# Patient Record
Sex: MALE | URBAN - NONMETROPOLITAN AREA
[De-identification: names, ages, dates, MRNs, and addresses within clinical notes are randomized per-mention and may not be internally consistent; named-entity substitution may affect disease eponyms.]

---

## 2017-07-19 ENCOUNTER — OPTICAL OFFICE (OUTPATIENT)
Dept: URBAN - NONMETROPOLITAN AREA CLINIC 4 | Facility: CLINIC | Age: 42
Setting detail: OPHTHALMOLOGY
End: 2017-07-19
Payer: COMMERCIAL

## 2017-07-19 DIAGNOSIS — H52.223: ICD-10-CM

## 2017-07-19 PROCEDURE — V2020 VISION SVCS FRAMES PURCHASES: HCPCS | Performed by: OPHTHALMOLOGY

## 2017-07-19 PROCEDURE — V2750 ANTI-REFLECTIVE COATING: HCPCS | Performed by: OPHTHALMOLOGY

## 2017-07-19 PROCEDURE — V2025 EYEGLASSES DELUX FRAMES: HCPCS | Performed by: OPHTHALMOLOGY

## 2017-07-19 PROCEDURE — V2781 PROGRESSIVE LENS PER LENS: HCPCS | Performed by: OPHTHALMOLOGY

## 2017-07-19 PROCEDURE — V2203 LENS SPHCYL BIFOCAL 4.00D/.1: HCPCS | Performed by: OPHTHALMOLOGY

## 2021-02-04 ENCOUNTER — DOCTOR'S OFFICE (OUTPATIENT)
Dept: URBAN - NONMETROPOLITAN AREA CLINIC 2 | Facility: CLINIC | Age: 46
Setting detail: OPHTHALMOLOGY
End: 2021-02-04
Payer: COMMERCIAL

## 2021-02-04 ENCOUNTER — OPTICAL OFFICE (OUTPATIENT)
Dept: URBAN - NONMETROPOLITAN AREA CLINIC 5 | Facility: CLINIC | Age: 46
Setting detail: OPHTHALMOLOGY
End: 2021-02-04
Payer: COMMERCIAL

## 2021-02-04 ENCOUNTER — RX ONLY (RX ONLY)
Age: 46
End: 2021-02-04

## 2021-02-04 DIAGNOSIS — H52.222: ICD-10-CM

## 2021-02-04 DIAGNOSIS — H52.4: ICD-10-CM

## 2021-02-04 PROBLEM — D23.121 OTHER BENIGN NEOPLASM OF SKIN OF LEFT UPPER EYELID, INCLUDING CANTHUS: Status: ACTIVE | Noted: 2021-02-04

## 2021-02-04 PROCEDURE — V2200 LENS SPHER BIFOC PLANO 4.00D: HCPCS | Performed by: OPTOMETRIST

## 2021-02-04 PROCEDURE — V2203 LENS SPHCYL BIFOCAL 4.00D/.1: HCPCS | Performed by: OPTOMETRIST

## 2021-02-04 PROCEDURE — V2750 ANTI-REFLECTIVE COATING: HCPCS | Performed by: OPTOMETRIST

## 2021-02-04 PROCEDURE — V2781 PROGRESSIVE LENS PER LENS: HCPCS | Performed by: OPTOMETRIST

## 2021-02-04 PROCEDURE — 92004 COMPRE OPH EXAM NEW PT 1/>: CPT | Performed by: OPTOMETRIST

## 2021-02-04 PROCEDURE — V2020 VISION SVCS FRAMES PURCHASES: HCPCS | Performed by: OPTOMETRIST

## 2021-02-04 PROCEDURE — V2025 EYEGLASSES DELUX FRAMES: HCPCS | Performed by: OPTOMETRIST

## 2021-02-04 ASSESSMENT — KERATOMETRY
OS_AXISANGLE_DEGREES: 085
OS_K1POWER_DIOPTERS: 41.25
OD_K2POWER_DIOPTERS: 41.50
OD_K1POWER_DIOPTERS: 41.50
OD_AXISANGLE_DEGREES: 090
OS_K2POWER_DIOPTERS: 41.75

## 2021-02-04 ASSESSMENT — CONFRONTATIONAL VISUAL FIELD TEST (CVF)
OD_FINDINGS: FULL
OS_FINDINGS: FULL

## 2021-02-04 ASSESSMENT — AXIALLENGTH_DERIVED
OD_AL: 24.2468
OS_AL: 24.1955
OS_AL: 24.2984

## 2021-02-04 ASSESSMENT — SPHEQUIV_DERIVED
OS_SPHEQUIV: 0.125
OS_SPHEQUIV: 0.375
OD_SPHEQUIV: 0.25

## 2021-02-04 ASSESSMENT — REFRACTION_MANIFEST
OD_CYLINDER: SPH
OS_VA2: 20/20
OS_ADD: +2.00
OS_VA1: 20/20
OD_VA1: 20/20
OD_ADD: +2.00
OS_CYLINDER: -0.25
OD_VA2: 20/20
OS_SPHERE: +0.25
OS_AXIS: 150
OD_SPHERE: PLANO

## 2021-02-04 ASSESSMENT — TONOMETRY
OD_IOP_MMHG: 15
OS_IOP_MMHG: 15

## 2021-02-04 ASSESSMENT — REFRACTION_AUTOREFRACTION
OD_CYLINDER: 0.00
OD_AXIS: 000
OD_SPHERE: +0.25
OS_AXIS: 152
OS_CYLINDER: -0.25
OS_SPHERE: +0.50

## 2021-02-04 ASSESSMENT — VISUAL ACUITY
OD_BCVA: 20/20
OS_BCVA: 20/20

## 2021-02-04 ASSESSMENT — REFRACTION_CURRENTRX
OS_OVR_VA: 20/
OD_OVR_VA: 20/

## 2021-03-31 DIAGNOSIS — Z23 ENCOUNTER FOR IMMUNIZATION: ICD-10-CM

## 2024-10-09 ENCOUNTER — ANESTHESIA EVENT (OUTPATIENT)
Dept: PERIOP | Facility: HOSPITAL | Age: 49
End: 2024-10-09
Payer: COMMERCIAL

## 2024-10-09 NOTE — ANESTHESIA PREPROCEDURE EVALUATION
Procedure:  MYRINGOTOMY W/ INSERTION VENTILATION TUBE EAR (Bilateral: Ear)    Relevant Problems   No relevant active problems        Physical Exam    Airway    Mallampati score: II  TM Distance: >3 FB  Neck ROM: full     Dental   No notable dental hx     Cardiovascular  Cardiovascular exam normal    Pulmonary  Pulmonary exam normal     Other Findings        Anesthesia Plan  ASA Score- 1     Anesthesia Type- IV sedation with anesthesia with ASA Monitors.         Additional Monitors:     Airway Plan:            Plan Factors-Exercise tolerance (METS): >4 METS.    Chart reviewed.  Imaging results reviewed. Existing labs reviewed. Patient summary reviewed.    Patient is not a current smoker.      There is medical exclusion for perioperative obstructive sleep apnea risk education.        Induction- intravenous.    Postoperative Plan-     Perioperative Resuscitation Plan - Level 1 - Full Code.       Informed Consent- Anesthetic plan and risks discussed with patient.  I personally reviewed this patient with the CRNA. Discussed and agreed on the Anesthesia Plan with the CRNA..

## 2024-10-10 ENCOUNTER — ANESTHESIA (OUTPATIENT)
Dept: PERIOP | Facility: HOSPITAL | Age: 49
End: 2024-10-10
Payer: COMMERCIAL

## 2024-10-10 ENCOUNTER — HOSPITAL ENCOUNTER (OUTPATIENT)
Facility: HOSPITAL | Age: 49
Setting detail: OUTPATIENT SURGERY
Discharge: HOME | End: 2024-10-10
Attending: OTOLARYNGOLOGY | Admitting: OTOLARYNGOLOGY
Payer: COMMERCIAL

## 2024-10-10 VITALS
BODY MASS INDEX: 32.13 KG/M2 | DIASTOLIC BLOOD PRESSURE: 70 MMHG | HEART RATE: 59 BPM | SYSTOLIC BLOOD PRESSURE: 108 MMHG | RESPIRATION RATE: 20 BRPM | TEMPERATURE: 97.8 F | OXYGEN SATURATION: 97 % | HEIGHT: 68 IN | WEIGHT: 212 LBS

## 2024-10-10 DIAGNOSIS — H66.93 OTITIS OF BOTH EARS: Primary | ICD-10-CM

## 2024-10-10 DEVICE — RICHARDS MODIFIED T-TUBE 1.32 MM ID 4.8 MM LENGTH SILICONE
Type: IMPLANTABLE DEVICE | Site: EAR | Status: FUNCTIONAL
Brand: GYRUS ACMI

## 2024-10-10 RX ORDER — OFLOXACIN 3 MG/ML
5 SOLUTION AURICULAR (OTIC) 2 TIMES DAILY
Qty: 10 ML | Refills: 0 | Status: SHIPPED | OUTPATIENT
Start: 2024-10-10

## 2024-10-10 RX ORDER — FERROUS SULFATE 325(65) MG
TABLET ORAL
COMMUNITY
Start: 2024-08-17

## 2024-10-10 RX ORDER — LIDOCAINE HYDROCHLORIDE 10 MG/ML
INJECTION, SOLUTION EPIDURAL; INFILTRATION; INTRACAUDAL; PERINEURAL AS NEEDED
Status: DISCONTINUED | OUTPATIENT
Start: 2024-10-10 | End: 2024-10-10

## 2024-10-10 RX ORDER — MONTELUKAST SODIUM 10 MG/1
TABLET ORAL
COMMUNITY
Start: 2024-09-13

## 2024-10-10 RX ORDER — ROSUVASTATIN CALCIUM 5 MG/1
TABLET, COATED ORAL
COMMUNITY
Start: 2024-09-09

## 2024-10-10 RX ORDER — AZELASTINE HYDROCHLORIDE 137 UG/1
SPRAY, METERED NASAL
COMMUNITY
Start: 2024-09-23

## 2024-10-10 RX ORDER — SODIUM CHLORIDE, SODIUM LACTATE, POTASSIUM CHLORIDE, CALCIUM CHLORIDE 600; 310; 30; 20 MG/100ML; MG/100ML; MG/100ML; MG/100ML
INJECTION, SOLUTION INTRAVENOUS CONTINUOUS PRN
Status: DISCONTINUED | OUTPATIENT
Start: 2024-10-10 | End: 2024-10-10

## 2024-10-10 RX ORDER — DEXMEDETOMIDINE HYDROCHLORIDE 4 UG/ML
INJECTION, SOLUTION INTRAVENOUS AS NEEDED
Status: DISCONTINUED | OUTPATIENT
Start: 2024-10-10 | End: 2024-10-10

## 2024-10-10 RX ORDER — ONDANSETRON 2 MG/ML
INJECTION INTRAMUSCULAR; INTRAVENOUS AS NEEDED
Status: DISCONTINUED | OUTPATIENT
Start: 2024-10-10 | End: 2024-10-10

## 2024-10-10 RX ORDER — PROPOFOL 10 MG/ML
INJECTION, EMULSION INTRAVENOUS AS NEEDED
Status: DISCONTINUED | OUTPATIENT
Start: 2024-10-10 | End: 2024-10-10

## 2024-10-10 RX ADMIN — LIDOCAINE HYDROCHLORIDE 50 MG: 10 INJECTION, SOLUTION EPIDURAL; INFILTRATION; INTRACAUDAL; PERINEURAL at 09:40

## 2024-10-10 RX ADMIN — PROPOFOL 50 MG: 10 INJECTION, EMULSION INTRAVENOUS at 09:43

## 2024-10-10 RX ADMIN — PROPOFOL 30 MG: 10 INJECTION, EMULSION INTRAVENOUS at 09:44

## 2024-10-10 RX ADMIN — PROPOFOL 150 MG: 10 INJECTION, EMULSION INTRAVENOUS at 09:40

## 2024-10-10 RX ADMIN — PROPOFOL 30 MG: 10 INJECTION, EMULSION INTRAVENOUS at 09:47

## 2024-10-10 RX ADMIN — PROPOFOL 40 MG: 10 INJECTION, EMULSION INTRAVENOUS at 09:45

## 2024-10-10 RX ADMIN — DEXMEDETOMIDINE HYDROCHLORIDE 20 MCG: 400 INJECTION INTRAVENOUS at 09:36

## 2024-10-10 RX ADMIN — ONDANSETRON 4 MG: 2 INJECTION INTRAMUSCULAR; INTRAVENOUS at 09:36

## 2024-10-10 RX ADMIN — SODIUM CHLORIDE, SODIUM LACTATE, POTASSIUM CHLORIDE, AND CALCIUM CHLORIDE: .6; .31; .03; .02 INJECTION, SOLUTION INTRAVENOUS at 09:39

## 2024-10-10 NOTE — INTERVAL H&P NOTE
H&P reviewed. After examining the patient I find no changes in the patients condition since the H&P had been written.    Vitals:    10/10/24 0915   BP: 126/77   Pulse: 67   Resp: 18   Temp: 98.2 °F (36.8 °C)   SpO2: 98%

## 2024-10-10 NOTE — DISCHARGE INSTR - AVS FIRST PAGE
[facility]  [address], [address]   [place] [address]   PHONE: [phone]     BOWEN BUSTOS M.D.       DR. BUSTOS'S POST OP INSTRUCTIONS FOR MYRINGOTOMY TUBES  Ear drops are occasionally provided for your use.  Current best practice recommendations do not require drops to be used and if there is not an active infection at the time of surgery, drops will not be given.  Floxin 5 drops twice a day for three days. Floxin Otic drops (sometimes eye drops) are used in the ear.  Save the drops for future use:  If you think water accidentally got into the ears, use two drops to the affected ear one time.  If infection or bloody drainage from the ear, (usually during a cold), use three drops in the affected ear three times a day.  If not better in three days, or if there is worsening, call our office.  Use Tylenol for any pain.  Swimplugs can be purchased over-the-counter.  In general, Dry ear precautions are not necessary for any clean water source such as a pool or shower/bath.  If swimming in a lake or pond, earplugs should be worn.  As always, feel free to call us if you have any questions.

## 2024-10-10 NOTE — ANESTHESIA POSTPROCEDURE EVALUATION
Post-Op Assessment Note    Last Filed PACU Vitals:  Vitals Value Taken Time   Temp 98 °F (36.7 °C) 10/10/24 1020   Pulse 67 10/10/24 1023   /66 10/10/24 1020   Resp 19 10/10/24 1023   SpO2 97 % 10/10/24 1023   Vitals shown include unfiled device data.    Modified Any:  Activity: 2 (10/10/2024 10:55 AM)  Respiration: 2 (10/10/2024 10:55 AM)  Circulation: 2 (10/10/2024 10:55 AM)  Consciousness: 2 (10/10/2024 10:55 AM)  Oxygen Saturation: 2 (10/10/2024 10:55 AM)  Modified Nay Score: 10 (10/10/2024 10:55 AM)

## 2024-10-10 NOTE — OP NOTE
OPERATIVE REPORT  PATIENT NAME: Mireya Pearl    :  1975  MRN: [id number]  Pt Location: OW OR ROOM 01    SURGERY DATE: 10/10/2024    Surgeons and Role:     * Hector Bustos MD - Primary    Preop Diagnosis:  Unspecified eustachian tube disorder, bilateral [H69.93]    Post-Op Diagnosis Codes:     * Unspecified eustachian tube disorder, bilateral [H69.93]    Procedure(s):  Bilateral -myringotomy with T tubes    Specimen(s):  * No specimens in log *    Estimated Blood Loss:   Minimal    Drains:  * No LDAs found *    Anesthesia Type:   IV Sedation with Anesthesia    Operative Indications:  Unspecified eustachian tube disorder, bilateral [H69.93]      Operative Findings:  Serous otitis      Complications:   None    Procedure and Technique:  The patient was identified and taken to the operative suite.  A timeout was called.  After the successful induction of IV sedation, the patient was prepped and draped in usual fashion.      A 4-0 speculum was inserted into the right external auditory canal and microscope was placed into position.  Under microscopic visualization, cerumen was debrided with a cerumen curette.  Using microscopic visualization, an anterior, inferior radial incision was made in the tympanic membrane and a serous effusion was suctioned with a #5 suction.  The myringotomy tube was placed.  The exact same findings and procedure were performed on the left ear as described on the right.      The patient was taken to the PACU in excellent condition.  Instrument and sponge counts were correct x 2 at the end of the case.     I was present for the entire procedure.    Patient Disposition:  PACU              SIGNATURE: Hector Bustos MD  DATE: October 10, 2024  TIME: 9:50 AM

## 2024-10-10 NOTE — DISCHARGE SUMMARY
Discharge Summary - ENT   Name: Mireya Pearl 49 y.o. male I MRN: [id number]  Unit/Bed#: OR POOL I Date of Admission: 10/10/2024   Date of Service: 10/10/2024 I Hospital Day: 0    Admission Date: 10/10/2024 0849  Discharge Date: 10/10/24  Admitting Diagnosis: Unspecified eustachian tube disorder, bilateral [H69.93]  Discharge Diagnosis:   Medical Problems       Resolved Problems  Date Reviewed: 10/10/2024   None         HPI: Status post BMT with T tubes    Procedures Performed: No orders of the defined types were placed in this encounter.      Summary of Hospital Course:  unremarkable    Significant Findings, Care, Treatment and Services Provided: Surgery    Complications: None    Condition at Discharge: good       Discharge instructions/Information to patient and family:   See After Visit Summary (AVS) for information provided to patient and family.      Provisions for Follow-Up Care:  See after visit summary for information related to follow-up care and any pertinent home health orders.      PCP: No primary care provider on file.    Disposition: Home    Planned Readmission: No     Discharge Medications:  See after visit summary for reconciled discharge medications provided to patient and family.      Discharge Statement:  I have spent a total time of 15 minutes in caring for this patient on the day of the visit/encounter. .

## 2024-10-10 NOTE — ANESTHESIA POSTPROCEDURE EVALUATION
Post-Op Assessment Note    CV Status:  Stable    Pain management: adequate       Mental Status:  Alert and sleepy   Hydration Status:  Euvolemic   PONV Controlled:  Controlled   Airway Patency:  Patent  Two or more mitigation strategies used for obstructive sleep apnea   Post Op Vitals Reviewed: Yes    No anethesia notable event occurred.    Staff: CRNA           Last Filed PACU Vitals:  Vitals Value Taken Time   Temp     Pulse 75 10/10/24 0952   BP 92/65 10/10/24 0951   Resp 19 10/10/24 0952   SpO2 99 % 10/10/24 0952   Vitals shown include unfiled device data.    Modified Nay:  No data recorded

## (undated) DEVICE — GAUZE SPONGES,USP TYPE VII GAUZE, 12 PLY: Brand: CURITY

## (undated) DEVICE — GLOVE INDICATOR PI UNDERGLOVE SZ 8 BLUE

## (undated) DEVICE — SKIN MARKER DUAL TIP WITH RULER CAP, FLEXIBLE RULER AND LABELS: Brand: DEVON

## (undated) DEVICE — SINGLE PORT MANIFOLD: Brand: NEPTUNE 2

## (undated) DEVICE — BLADE MYRINGOTOMY 377121

## (undated) DEVICE — MAYO STAND COVER: Brand: CONVERTORS

## (undated) DEVICE — SOLUTION BOWL: Brand: KENDALL

## (undated) DEVICE — SYRINGE 3ML LL

## (undated) DEVICE — TUBING SUCTION 5MM X 12 FT

## (undated) DEVICE — DISPOSABLE OR TOWEL: Brand: CARDINAL HEALTH